# Patient Record
Sex: FEMALE | Race: WHITE | Employment: FULL TIME | URBAN - METROPOLITAN AREA
[De-identification: names, ages, dates, MRNs, and addresses within clinical notes are randomized per-mention and may not be internally consistent; named-entity substitution may affect disease eponyms.]

---
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
## 2023-01-13 ENCOUNTER — MYC MEDICAL ADVICE (OUTPATIENT)
Dept: FAMILY MEDICINE | Facility: OTHER | Age: 18
End: 2023-01-13

## 2023-01-13 DIAGNOSIS — K13.0 LIP LESION: Primary | ICD-10-CM

## 2023-01-16 NOTE — TELEPHONE ENCOUNTER
"Routine preventative exam on 11/7/22. Lip lesion was noted and was treated with liquid nitrogen. Provider indicated: \"- If doesn't improve or returns, would possibly need referral to ENT for removal \"    PCP-please see pt message that lesion is not improving. Would you like to place a referral or do you have further recommendations?    Belem Lynch, EMMAN, RN, PHN  Registered Nurse-Clinic Triage  [facility]  1/16/2023 at 8:12 AM    "

## 2023-04-18 NOTE — PROGRESS NOTES
Chief Complaint   Patient presents with     Ent Problem     Small lesion noted on bottom of lip - patient tried to pop it with bloody returns and it got larger for short time - noted for about a year- denies pain at site      History of Present Illness   Lita Alvares is a 17 year old female who presents today for evaluation.  I am seeing this patient in consultation for lip lesion at the request of the provider Mandi Uribe PA-C. The patient presents with a lesion on the right lower mucosal lip. The patient has noticed for a few years. It hasn't been changing in size. It is not painful. No citrus or spicy intolerance.  Initially she popped the lesion and it bled a bit before stopping.  It then got a bit bigger but now is gotten smaller again.  They had tried to treat it with liquid nitrogen but this did not resolve the lesion.  The patient is a non-smoker, and no history of chewing tobacco. No lumps or swollen glands in the neck.     Past Medical History  Patient Active Problem List   Diagnosis     No active medical problems     Current Medications   No current outpatient medications on file.    Allergies  No Known Allergies    Social History   Social History     Socioeconomic History     Marital status: Single   Tobacco Use     Smoking status: Never     Smokeless tobacco: Never   Vaping Use     Vaping status: Never Used   Substance and Sexual Activity     Alcohol use: Never     Drug use: Never     Sexual activity: Yes     Partners: Male       Family History  Family History   Problem Relation Age of Onset     Cancer Maternal Grandmother         colon     Cerebrovascular Disease Maternal Grandmother      Cancer Maternal Grandfather         colon     Heart Disease Maternal Grandfather 50     Cancer Paternal Grandfather         Hodgkins/Prostate     Asthma Paternal Grandfather      Asthma Paternal Uncle        Review of Systems  As per HPI and PMHx, otherwise 10+ comprehensive system review is  negative.    Physical Exam  /73 (BP Location: Right arm, Patient Position: Chair, Cuff Size: Adult Regular)   Pulse 72   Temp 98.4  F (36.9  C) (Tympanic)   Wt 68 kg (150 lb)   BMI 29.03 kg/m    GENERAL: Patient is a pleasant, cooperative 17 year old female in no acute distress.  HEAD: Normocephalic, atraumatic.  Hair and scalp are normal.  EYES: Pupils are equal, round, reactive to light and accommodation.  Extraocular movements are intact.  The sclera nonicteric without injection.  The extraocular structures are normal.  EARS: Normal shape and symmetry.  No tenderness when palpating the mastoid or tragal areas bilaterally.   NOSE: Nares are patent.  Nasal mucosa is pink and moist.  Negative anterior rhinoscopy.  ORAL CAVITY: Examination of the right lower lip reveals a 3 to 4 mm exophytic purpleish hue lesion consistent with a venous lake.  Dentition is in good repair.  Mucous membranes are moist. No additional oral cavity or oropharyngeal masses, lesions, ulcerations, leukoplakia.  NEUROLOGIC: Cranial nerves II through XII are grossly intact.  Voice is strong.  Patient is House-Brackmann I/VI bilaterally.  CARDIOVASCULAR: Extremities are warm and well-perfused.  No significant peripheral edema.  RESPIRATORY: Patient has nonlabored breathing without cough, wheeze, stridor.  PSYCHIATRIC: Patient is alert and oriented.  Mood and affect appear normal.  SKIN: Warm and dry.  No scalp, face, or neck lesions noted.    Assessment and Plan     ICD-10-CM    1. 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  The patient presents today with a lesion on the right lower lip that has a clinical appearance of a venous lake or a vascular lesion.  I think surgical excision might be too aggressive and might leave unsightly scarring.  I will send a message to dermatology to see if they do any lesion ablation, pulsed dye  laser, or vascular laser treatments for these lesions.    Ed Masterson MD  Department of Otolaryngology-Head and Neck Surgery  [facility]

## 2023-04-19 ENCOUNTER — OFFICE VISIT (OUTPATIENT)
Dept: OTOLARYNGOLOGY | Facility: CLINIC | Age: 18
End: 2023-04-19
Attending: PHYSICIAN ASSISTANT
Payer: COMMERCIAL

## 2023-04-19 VITALS
HEART RATE: 72 BPM | BODY MASS INDEX: 29.03 KG/M2 | TEMPERATURE: 98.4 F | DIASTOLIC BLOOD PRESSURE: 73 MMHG | WEIGHT: 150 LBS | SYSTOLIC BLOOD PRESSURE: 125 MMHG

## 2023-04-19 DIAGNOSIS — K13.0 LIP LESION: Primary | ICD-10-CM

## 2023-04-19 DIAGNOSIS — R23.8 VENOUS LAKE OF LIP: ICD-10-CM

## 2023-04-19 PROCEDURE — 99243 OFF/OP CNSLTJ NEW/EST LOW 30: CPT | Performed by: OTOLARYNGOLOGY

## 2023-04-19 ASSESSMENT — PAIN SCALES - GENERAL: PAINLEVEL: NO PAIN (0)

## 2023-04-19 NOTE — LETTER
4/19/2023         RE: Lita Alvares  [address] [facility] [address]        Dear Colleague,    Thank you for referring your patient, Lita Alvares, to the [facility]. Please see a copy of my visit note below.    Chief Complaint   Patient presents with     Ent Problem     Small lesion noted on bottom of lip - patient tried to pop it with bloody returns and it got larger for short time - noted for about a year- denies pain at site      History of Present Illness   Lita Alvares is a 17 year old female who presents today for evaluation.  I am seeing this patient in consultation for lip lesion at the request of the provider Mandi Uribe PA-C. The patient presents with a lesion on the right lower mucosal lip. The patient has noticed for a few years. It hasn't been changing in size. It is not painful. No citrus or spicy intolerance.  Initially she popped the lesion and it bled a bit before stopping.  It then got a bit bigger but now is gotten smaller again.  They had tried to treat it with liquid nitrogen but this did not resolve the lesion.  The patient is a non-smoker, and no history of chewing tobacco. No lumps or swollen glands in the neck.     Past Medical History  Patient Active Problem List   Diagnosis     No active medical problems     Current Medications   No current outpatient medications on file.    Allergies  No Known Allergies    Social History   Social History     Socioeconomic History     Marital status: Single   Tobacco Use     Smoking status: Never     Smokeless tobacco: Never   Vaping Use     Vaping status: Never Used   Substance and Sexual Activity     Alcohol use: Never     Drug use: Never     Sexual activity: Yes     Partners: Male       Family History  Family History   Problem Relation Age of Onset     Cancer Maternal Grandmother         colon     Cerebrovascular Disease Maternal Grandmother      Cancer Maternal Grandfather          colon     Heart Disease Maternal Grandfather 50     Cancer Paternal Grandfather         Hodgkins/Prostate     Asthma Paternal Grandfather      Asthma Paternal Uncle        Review of Systems  As per HPI and PMHx, otherwise 10+ comprehensive system review is negative.    Physical Exam  /73 (BP Location: Right arm, Patient Position: Chair, Cuff Size: Adult Regular)   Pulse 72   Temp 98.4  F (36.9  C) (Tympanic)   Wt 68 kg (150 lb)   BMI 29.03 kg/m    GENERAL: Patient is a pleasant, cooperative 17 year old female in no acute distress.  HEAD: Normocephalic, atraumatic.  Hair and scalp are normal.  EYES: Pupils are equal, round, reactive to light and accommodation.  Extraocular movements are intact.  The sclera nonicteric without injection.  The extraocular structures are normal.  EARS: Normal shape and symmetry.  No tenderness when palpating the mastoid or tragal areas bilaterally.   NOSE: Nares are patent.  Nasal mucosa is pink and moist.  Negative anterior rhinoscopy.  ORAL CAVITY: Examination of the right lower lip reveals a 3 to 4 mm exophytic purpleish hue lesion consistent with a venous lake.  Dentition is in good repair.  Mucous membranes are moist. No additional oral cavity or oropharyngeal masses, lesions, ulcerations, leukoplakia.  NEUROLOGIC: Cranial nerves II through XII are grossly intact.  Voice is strong.  Patient is House-Brackmann I/VI bilaterally.  CARDIOVASCULAR: Extremities are warm and well-perfused.  No significant peripheral edema.  RESPIRATORY: Patient has nonlabored breathing without cough, wheeze, stridor.  PSYCHIATRIC: Patient is alert and oriented.  Mood and affect appear normal.  SKIN: Warm and dry.  No scalp, face, or neck lesions noted.    Assessment and Plan     ICD-10-CM    1. 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  The patient presents today with a lesion on the  right lower lip that has a clinical appearance of a venous lake or a vascular lesion.  I think surgical excision might be too aggressive and might leave unsightly scarring.  I will send a message to dermatology to see if they do any lesion ablation, pulsed dye laser, or vascular laser treatments for these lesions.    Ed Masterson MD  Department of Otolaryngology-Head and Neck Surgery  [facility]         Again, thank you for allowing me to participate in the care of your patient.        Sincerely,        Ed Masterson MD

## 2023-04-19 NOTE — NURSING NOTE
"Initial /73 (BP Location: Right arm, Patient Position: Chair, Cuff Size: Adult Regular)   Pulse 72   Temp 98.4  F (36.9  C) (Tympanic)   Wt 68 kg (150 lb)   BMI 29.03 kg/m   Estimated body mass index is 29.03 kg/m  as calculated from the following:    Height as of 11/7/22: 1.531 m (5' 0.28\").    Weight as of this encounter: 68 kg (150 lb). .      "

## 2023-04-20 ENCOUNTER — TELEPHONE (OUTPATIENT)
Dept: DERMATOLOGY | Facility: CLINIC | Age: 18
End: 2023-04-20
Payer: COMMERCIAL

## 2023-04-20 NOTE — TELEPHONE ENCOUNTER
----- Message from Jens Cristina sent at 4/20/2023 10:03 AM CDT -----  Regarding: FW: Venous lake/vascular lip lesion  Date and Time: 04/20/23 10:04 AM    Call Reason: appointment    Outcome: Left message on cell phone for Lita Alvares to call and schedule an appointment:    Clinic: Dermatology    With: Joni Lawrence MD    Appointment should be scheduled within:  2 weeks    Visit Mode: in person visit     Reason for Appointment: Procedure    Provider Approved Double Book:Yes: Held appt on 5/3/23 @ 830    Additional Information: Steph Hernández of lip Cautery per   ref Aleja            ----- Message -----  From: Brandee Yanez RN  Sent: 4/20/2023   9:45 AM CDT  [facility]  Subject: FW: Venous lake/vascular lip lesion              Patient can be double booked in the next two weeks at [place]-   ----- Message -----  From: Joni Lawrence MD  Sent: 4/20/2023   7:37 AM CDT  To: Ed Masterson MD; Katie Menon PA-C; #  Subject: RE: Venous lake/vascular lip lesion              Hi Ed    We could just try cautery that area of the lip heals very well.  Laser is another option but insurance will not cover so I would try cautery first    I will have our team schedule her    Thanks      ----- Message -----  From: Ed Masterson MD  Sent: 4/19/2023   2:10 PM CDT  To: Katie Menon PA-C; #  Subject: Venous lake/vascular lip lesion                  I have a young lady with a small venous lake of her lip.  I do not want to excise it and leave a scar.  I was not sure if you guys did in the ablation, laser, other treatments that would avoid having a scar on her lip in this area?  There are pictures in the chart.    IJL

## 2023-04-21 NOTE — CONFIDENTIAL NOTE
Called patient and changed to Monday procedure spot.  Thank you,    Brisa ZULUAGARN BSN  [facility]- [phone]

## 2023-04-22 ENCOUNTER — HEALTH MAINTENANCE LETTER (OUTPATIENT)
Age: 18
End: 2023-04-22

## 2023-04-24 ENCOUNTER — OFFICE VISIT (OUTPATIENT)
Dept: DERMATOLOGY | Facility: CLINIC | Age: 18
End: 2023-04-24
Payer: COMMERCIAL

## 2023-04-24 DIAGNOSIS — R23.8 VENOUS LAKE: ICD-10-CM

## 2023-04-24 DIAGNOSIS — D23.9 DERMAL NEVUS: ICD-10-CM

## 2023-04-24 DIAGNOSIS — L81.4 LENTIGO: Primary | ICD-10-CM

## 2023-04-24 PROCEDURE — 17110 DESTRUCTION B9 LES UP TO 14: CPT | Performed by: DERMATOLOGY

## 2023-04-24 PROCEDURE — 99243 OFF/OP CNSLTJ NEW/EST LOW 30: CPT | Mod: 25 | Performed by: DERMATOLOGY

## 2023-04-24 NOTE — PROGRESS NOTES
Lita Alvares , a 17 year old year old female patient, I was asked to see by Dr. Masterson for spot on lip.  Patient states this has been present for a while.  Patient reports the following symptoms:  Red and bleeding .  Patient reports the following previous treatments none.  Patient reports the following modifying factors none.  Associated symptoms: none.  Patient has no other skin complaints today.  Remainder of the HPI, Meds, PMH, Allergies, FH, and SH was reviewed in chart.    History reviewed. No pertinent past medical history.    Past Surgical History:   Procedure Laterality Date     NO HISTORY OF SURGERY          Family History   Problem Relation Age of Onset     Cancer Maternal Grandmother         colon     Cerebrovascular Disease Maternal Grandmother      Cancer Maternal Grandfather         colon     Heart Disease Maternal Grandfather 50     Cancer Paternal Grandfather         Hodgkins/Prostate     Asthma Paternal Grandfather      Asthma Paternal Uncle        Social History     Socioeconomic History     Marital status: Single     Spouse name: Not on file     Number of children: Not on file     Years of education: Not on file     Highest education level: Not on file   Occupational History     Not on file   Tobacco Use     Smoking status: Never     Smokeless tobacco: Never   Vaping Use     Vaping status: Never Used   Substance and Sexual Activity     Alcohol use: Never     Drug use: Never     Sexual activity: Yes     Partners: Male   Other Topics Concern     Not on file   Social History Narrative     Not on file     Social Determinants of Health     Financial Resource Strain: Not on file   Food Insecurity: Not on file   Transportation Needs: Not on file   Physical Activity: Not on file   Stress: Not on file   Social Connections: Not on file   Intimate Partner Violence: Not on file   Housing Stability: Not on file       No outpatient encounter medications on file as of 4/24/2023.     No  facility-administered encounter medications on file as of 4/24/2023.             Review Of Systems  Skin: As above  Eyes: negative  Ears/Nose/Throat: negative  Respiratory: No shortness of breath, dyspnea on exertion, cough, or hemoptysis  Cardiovascular: negative  Gastrointestinal: negative  Genitourinary: negative  Musculoskeletal: negative  Neurologic: negative  Psychiatric: negative  Hematologic/Lymphatic/Immunologic: negative  Endocrine: negative      O:   NAD, WDWN, Alert & Oriented, Mood & Affect wnl, Vitals stable   Here today alone   General appearance alva ii   Vitals stable   Alert, oriented and in no acute distress   Brown macule son face  Flesh colored papules on face   r lower mucosal lip red papule       Eyes: Conjunctivae/lids:Normal     ENT: Lips, buccal mucosa, tongue: normal    MSK:Normal    Cardiovascular: peripheral edema none    Pulm: Breathing Normal    Neuro/Psych: Orientation:Normal; Mood/Affect:Normal      A/P:  1. lentigo, dermal nevus  2. Venous lake  Laser and cautery discussed with patient   Cautery After consent, anesthesia with LE and prep, cautery performed.  No complications and routine wound care.    It was a pleasure speaking to Lita Alvares today.  Previous clinic  notes and pertinent laboratory tests were reviewed prior to Lita Alvares's visit.  Nature of benign skin lesions dicussed with patient.  Return to clinic prn

## 2023-04-24 NOTE — LETTER
4/24/2023         RE: Lita Alvares  [address] [facility] [address]        Dear Colleague,    Thank you for referring your patient, Lita Alvares, to the [facility]. Please see a copy of my visit note below.    Lita Alvares , a 17 year old year old female patient, I was asked to see by Dr. Masterson for spot on lip.  Patient states this has been present for a while.  Patient reports the following symptoms:  Red and bleeding .  Patient reports the following previous treatments none.  Patient reports the following modifying factors none.  Associated symptoms: none.  Patient has no other skin complaints today.  Remainder of the HPI, Meds, PMH, Allergies, FH, and SH was reviewed in chart.    History reviewed. No pertinent past medical history.    Past Surgical History:   Procedure Laterality Date     NO HISTORY OF SURGERY          Family History   Problem Relation Age of Onset     Cancer Maternal Grandmother         colon     Cerebrovascular Disease Maternal Grandmother      Cancer Maternal Grandfather         colon     Heart Disease Maternal Grandfather 50     Cancer Paternal Grandfather         Hodgkins/Prostate     Asthma Paternal Grandfather      Asthma Paternal Uncle        Social History     Socioeconomic History     Marital status: Single     Spouse name: Not on file     Number of children: Not on file     Years of education: Not on file     Highest education level: Not on file   Occupational History     Not on file   Tobacco Use     Smoking status: Never     Smokeless tobacco: Never   Vaping Use     Vaping status: Never Used   Substance and Sexual Activity     Alcohol use: Never     Drug use: Never     Sexual activity: Yes     Partners: Male   Other Topics Concern     Not on file   Social History Narrative     Not on file     Social Determinants of Health     Financial Resource Strain: Not on file   Food Insecurity: Not on file   Transportation Needs: Not on  file   Physical Activity: Not on file   Stress: Not on file   Social Connections: Not on file   Intimate Partner Violence: Not on file   Housing Stability: Not on file       No outpatient encounter medications on file as of 4/24/2023.     No facility-administered encounter medications on file as of 4/24/2023.             Review Of Systems  Skin: As above  Eyes: negative  Ears/Nose/Throat: negative  Respiratory: No shortness of breath, dyspnea on exertion, cough, or hemoptysis  Cardiovascular: negative  Gastrointestinal: negative  Genitourinary: negative  Musculoskeletal: negative  Neurologic: negative  Psychiatric: negative  Hematologic/Lymphatic/Immunologic: negative  Endocrine: negative      O:   NAD, WDWN, Alert & Oriented, Mood & Affect wnl, Vitals stable   Here today alone   General appearance alva ii   Vitals stable   Alert, oriented and in no acute distress   Brown macule son face  Flesh colored papules on face   r lower mucosal lip red papule       Eyes: Conjunctivae/lids:Normal     ENT: Lips, buccal mucosa, tongue: normal    MSK:Normal    Cardiovascular: peripheral edema none    Pulm: Breathing Normal    Neuro/Psych: Orientation:Normal; Mood/Affect:Normal      A/P:  1. lentigo, dermal nevus  2. Venous lake  Laser and cautery discussed with patient   Cautery After consent, anesthesia with LE and prep, cautery performed.  No complications and routine wound care.    It was a pleasure speaking to Lita Alvares today.  Previous clinic  notes and pertinent laboratory tests were reviewed prior to Lita Alvares's visit.  Nature of benign skin lesions dicussed with patient.  Return to clinic prn         Again, thank you for allowing me to participate in the care of your patient.        Sincerely,        Joni Lawrence MD

## 2024-01-31 ENCOUNTER — TELEPHONE (OUTPATIENT)
Dept: FAMILY MEDICINE | Facility: OTHER | Age: 19
End: 2024-01-31
Payer: COMMERCIAL

## 2024-01-31 NOTE — TELEPHONE ENCOUNTER
Per provider, patient's appointment needs to be rescheduled due to schedule change.  Please reschedule patient in the next available  in clinic  slot.    Cancelled appointment with Mandi Uribe PA-C on 2/22/2024 at 9:40am.     Patient was called and message left informing patient/parent of the need to be rescheduled.  Appointment was cancelled and encounter created for message trail.    Naehast was also sent to inform patient of this. Kaur Lambert

## 2024-02-10 ENCOUNTER — HEALTH MAINTENANCE LETTER (OUTPATIENT)
Age: 19
End: 2024-02-10

## 2024-02-29 ENCOUNTER — TELEPHONE (OUTPATIENT)
Dept: FAMILY MEDICINE | Facility: OTHER | Age: 19
End: 2024-02-29
Payer: COMMERCIAL

## 2024-02-29 NOTE — TELEPHONE ENCOUNTER
Per provider, patient's appointment needs to be rescheduled due to change in provider's schedule.  Please reschedule patient in the next available  in clinic  slot.    Cancelled appointment with Mandi Uribe PA-C on 4/8/2024 at 5:10pm.     Patient was called and message left informing patient/parent of the need to be rescheduled.  Appointment was cancelled and encounter created for message trail.    Siteminist was also sent to inform patient of this. Kaur Lambert

## 2024-04-22 SDOH — HEALTH STABILITY: PHYSICAL HEALTH: ON AVERAGE, HOW MANY MINUTES DO YOU ENGAGE IN EXERCISE AT THIS LEVEL?: 60 MIN

## 2024-04-22 SDOH — HEALTH STABILITY: PHYSICAL HEALTH: ON AVERAGE, HOW MANY DAYS PER WEEK DO YOU ENGAGE IN MODERATE TO STRENUOUS EXERCISE (LIKE A BRISK WALK)?: 4 DAYS

## 2024-04-22 ASSESSMENT — SOCIAL DETERMINANTS OF HEALTH (SDOH): HOW OFTEN DO YOU GET TOGETHER WITH FRIENDS OR RELATIVES?: MORE THAN THREE TIMES A WEEK

## 2024-04-23 ENCOUNTER — OFFICE VISIT (OUTPATIENT)
Dept: FAMILY MEDICINE | Facility: OTHER | Age: 19
End: 2024-04-23
Payer: COMMERCIAL

## 2024-04-23 VITALS
DIASTOLIC BLOOD PRESSURE: 70 MMHG | RESPIRATION RATE: 16 BRPM | TEMPERATURE: 98.1 F | BODY MASS INDEX: 28.83 KG/M2 | HEART RATE: 99 BPM | SYSTOLIC BLOOD PRESSURE: 120 MMHG | OXYGEN SATURATION: 98 % | WEIGHT: 149 LBS

## 2024-04-23 DIAGNOSIS — Z00.00 ROUTINE GENERAL MEDICAL EXAMINATION AT A HEALTH CARE FACILITY: Primary | ICD-10-CM

## 2024-04-23 DIAGNOSIS — Z13.1 SCREENING FOR DIABETES MELLITUS: ICD-10-CM

## 2024-04-23 DIAGNOSIS — Z13.220 SCREENING FOR LIPID DISORDERS: ICD-10-CM

## 2024-04-23 LAB
CHOLEST SERPL-MCNC: 148 MG/DL
FASTING STATUS PATIENT QL REPORTED: NO
FASTING STATUS PATIENT QL REPORTED: NO
GLUCOSE SERPL-MCNC: 84 MG/DL (ref 70–99)
HDLC SERPL-MCNC: 61 MG/DL
LDLC SERPL CALC-MCNC: 75 MG/DL
NONHDLC SERPL-MCNC: 87 MG/DL
TRIGL SERPL-MCNC: 58 MG/DL

## 2024-04-23 PROCEDURE — 36415 COLL VENOUS BLD VENIPUNCTURE: CPT | Performed by: PHYSICIAN ASSISTANT

## 2024-04-23 PROCEDURE — 82947 ASSAY GLUCOSE BLOOD QUANT: CPT | Performed by: PHYSICIAN ASSISTANT

## 2024-04-23 PROCEDURE — 99395 PREV VISIT EST AGE 18-39: CPT | Performed by: PHYSICIAN ASSISTANT

## 2024-04-23 PROCEDURE — 80061 LIPID PANEL: CPT | Performed by: PHYSICIAN ASSISTANT

## 2024-04-23 ASSESSMENT — PAIN SCALES - GENERAL: PAINLEVEL: NO PAIN (0)

## 2024-04-23 NOTE — PATIENT INSTRUCTIONS
Preventive Care Advice   This is general advice given by our system to help you stay healthy. However, your care team may have specific advice just for you. Please talk to your care team about your preventive care needs.  Nutrition  Eat 5 or more servings of fruits and vegetables each day.  Try wheat bread, brown rice and whole grain pasta (instead of white bread, rice, and pasta).  Get enough calcium and vitamin D. Check the label on foods and aim for 100% of the RDA (recommended daily allowance).  Lifestyle  Exercise at least 150 minutes each week   (30 minutes a day, 5 days a week).  Do muscle strengthening activities 2 days a week. These help control your weight and prevent disease.  No smoking.  Wear sunscreen to prevent skin cancer.  Have a dental exam and cleaning every 6 months.  Yearly exams  See your health care team every year to talk about:  Any changes in your health.  Any medicines your care team has prescribed.  Preventive care, family planning, and ways to prevent chronic diseases.  Shots (vaccines)   HPV shots (up to age 26), if you've never had them before.  Hepatitis B shots (up to age 59), if you've never had them before.  COVID-19 shot: Get this shot when it's due.  Flu shot: Get a flu shot every year.  Tetanus shot: Get a tetanus shot every 10 years.  Pneumococcal, hepatitis A, and RSV shots: Ask your care team if you need these based on your risk.  Shingles shot (for age 50 and up).  General health tests  Diabetes screening:  Starting at age 35, Get screened for diabetes at least every 3 years.  If you are younger than age 35, ask your care team if you should be screened for diabetes.  Cholesterol test: At age 39, start having a cholesterol test every 5 years, or more often if advised.  Bone density scan (DEXA): At age 50, ask your care team if you should have this scan for osteoporosis (brittle bones).  Hepatitis C: Get tested at least once in your life.  STIs (sexually transmitted  infections)  Before age 24: Ask your care team if you should be screened for STIs.  After age 24: Get screened for STIs if you're at risk. You are at risk for STIs (including HIV) if:  You are sexually active with more than one person.  You don't use condoms every time.  You or a partner was diagnosed with a sexually transmitted infection.  If you are at risk for HIV, ask about PrEP medicine to prevent HIV.  Get tested for HIV at least once in your life, whether you are at risk for HIV or not.  Cancer screening tests  Cervical cancer screening: If you have a cervix, begin getting regular cervical cancer screening tests at age 21. Most people who have regular screenings with normal results can stop after age 65. Talk about this with your provider.  Breast cancer scan (mammogram): If you've ever had breasts, begin having regular mammograms starting at age 40. This is a scan to check for breast cancer.  Colon cancer screening: It is important to start screening for colon cancer at age 45.  Have a colonoscopy test every 10 years (or more often if you're at risk) Or, ask your provider about stool tests like a FIT test every year or Cologuard test every 3 years.  To learn more about your testing options, visit: https://www.[employer]/514715.pdf.  For help making a decision, visit: https://bit.ly/rw43106.  Prostate cancer screening test: If you have a prostate and are age 55 to 69, ask your provider if you would benefit from a yearly prostate cancer screening test.  Lung cancer screening: If you are a current or former smoker age 50 to 80, ask your care team if ongoing lung cancer screenings are right for you.  For informational purposes only. Not to replace the advice of your health care provider. Copyright   2023 [employer]. All rights reserved. Clinically reviewed by the [facility]. [employer] 125021 - REV 01/24.    Learning About Stress  What is stress?     Stress is your  body's response to a hard situation. Your body can have a physical, emotional, or mental response. Stress is a fact of life for most people, and it affects everyone differently. What causes stress for you may not be stressful for someone else.  A lot of things can cause stress. You may feel stress when you go on a job interview, take a test, or run a race. This kind of short-term stress is normal and even useful. It can help you if you need to work hard or react quickly. For example, stress can help you finish an important job on time.  Long-term stress is caused by ongoing stressful situations or events. Examples of long-term stress include long-term health problems, ongoing problems at work, or conflicts in your family. Long-term stress can harm your health.  How does stress affect your health?  When you are stressed, your body responds as though you are in danger. It makes hormones that speed up your heart, make you breathe faster, and give you a burst of energy. This is called the fight-or-flight stress response. If the stress is over quickly, your body goes back to normal and no harm is done.  But if stress happens too often or lasts too long, it can have bad effects. Long-term stress can make you more likely to get sick, and it can make symptoms of some diseases worse. If you tense up when you are stressed, you may develop neck, shoulder, or low back pain. Stress is linked to high blood pressure and heart disease.  Stress also harms your emotional health. It can make you huggins, tense, or depressed. Your relationships may suffer, and you may not do well at work or school.  What can you do to manage stress?  You can try these things to help manage stress:   Do something active. Exercise or activity can help reduce stress. Walking is a great way to get started. Even everyday activities such as housecleaning or yard work can help.  Try yoga or ja chi. These techniques combine exercise and meditation. You may need  some training at first to learn them.  Do something you enjoy. For example, listen to music or go to a movie. Practice your hobby or do volunteer work.  Meditate. This can help you relax, because you are not worrying about what happened before or what may happen in the future.  Do guided imagery. Imagine yourself in any setting that helps you feel calm. You can use online videos, books, or a teacher to guide you.  Do breathing exercises. For example:  From a standing position, bend forward from the waist with your knees slightly bent. Let your arms dangle close to the floor.  Breathe in slowly and deeply as you return to a standing position. Roll up slowly and lift your head last.  Hold your breath for just a few seconds in the standing position.  Breathe out slowly and bend forward from the waist.  Let your feelings out. Talk, laugh, cry, and express anger when you need to. Talking with supportive friends or family, a counselor, or a jackie leader about your feelings is a healthy way to relieve stress. Avoid discussing your feelings with people who make you feel worse.  Write. It may help to write about things that are bothering you. This helps you find out how much stress you feel and what is causing it. When you know this, you can find better ways to cope.  What can you do to prevent stress?  You might try some of these things to help prevent stress:  Manage your time. This helps you find time to do the things you want and need to do.  Get enough sleep. Your body recovers from the stresses of the day while you are sleeping.  Get support. Your family, friends, and community can make a difference in how you experience stress.  Limit your news feed. Avoid or limit time on social media or news that may make you feel stressed.  Do something active. Exercise or activity can help reduce stress. Walking is a great way to get started.  Where can you learn more?  Go to https://www.healthwise.net/patiented  Enter N032 in the  "search box to learn more about \"Learning About Stress.\"  Current as of: October 24, 2023               Content Version: 14.0    5536-4206 [employer].   Care instructions adapted under license by your healthcare professional. If you have questions about a medical condition or this instruction, always ask your healthcare professional. [employer] disclaims any warranty or liability for your use of this information.      "

## 2024-04-23 NOTE — PROGRESS NOTES
Preventive Care Visit  [facility]  Mandi LShwetha Uribe PA-C, Family Medicine  Apr 23, 2024    Assessment & Plan     ICD-10-CM    1. Routine general medical examination at a health care facility  Z00.00       2. Screening for diabetes mellitus  Z13.1 Glucose      3. Screening for lipid disorders  Z13.220 Lipid panel reflex to direct LDL Fasting          Patient has been advised of split billing requirements and indicates understanding: Yes      Counseling  Appropriate preventive services were discussed with this patient, including applicable screening as appropriate for fall prevention, nutrition, physical activity, Tobacco-use cessation, weight loss and cognition.  Checklist reviewing preventive services available has been given to the patient.  Reviewed patient's diet, addressing concerns and/or questions.   She is at risk for psychosocial distress and has been provided with information to reduce risk.     - Patient reports doing quite well  - Stopped OCP awhile ago, feels better off it   - Has fiancee, no concerns for stds   - Will gets screening labs, await results     Review of the result(s) of each unique test - None     Diagnosis or treatment significantly limited by social determinants of health - none     20 minutes spent on the date of the encounter doing chart review, history and exam, documentation and further activities as noted above    The patient indicates understanding of these issues and agrees with the plan.    Follow up: 1 year or PRN     I, Taran Uribe PA-C,  was present with the PA student who participated in the service and in the documentation of the note.  I have verified the history and personally performed the physical exam and medical decision making.  I agree with the assessment and plan of care as documented in the note.     VICKIE MccainS   [facility]     Taran Uribe PA-C  [facility]          Megha London is a 18 year old, presenting for the following:  Physical         Health Care Directive  Patient does not have a Health Care Directive or Living Will: Discussed advance care planning with patient; however, patient declined at this time.    HPI    - Patient reports doing well  - Stopped OCP and feels better off it   - Bought a house with her fitammye   - Planning a wedding         4/22/2024   General Health   How would you rate your overall physical health? Good   Feel stress (tense, anxious, or unable to sleep) Only a little   (!) STRESS CONCERN      4/22/2024   Nutrition   Three or more servings of calcium each day? (!) I DON'T KNOW   Diet: Regular (no restrictions)   How many servings of fruit and vegetables per day? (!) 2-3   How many sweetened beverages each day? 0-1         4/22/2024   Exercise   Days per week of moderate/strenous exercise 4 days   Average minutes spent exercising at this level 60 min         4/22/2024   Social Factors   Frequency of gathering with friends or relatives More than three times a week   Worry food won't last until get money to buy more No   Food not last or not have enough money for food? No   Do you have housing?  Yes   Are you worried about losing your housing? No   Lack of transportation? No   Unable to get utilities (heat,electricity)? No         4/22/2024   Dental   Dentist two times every year? Yes         4/22/2024   TB Screening   Were you born outside of the [place]? No         Today's PHQ-2 Score:       4/22/2024     7:27 PM   PHQ-2 ( 1999 [employer])   Q1: Little interest or pleasure in doing things 0   Q2: Feeling down, depressed or hopeless 0   PHQ-2 Score 0   Q1: Little interest or pleasure in doing things Not at all   Q2: Feeling down, depressed or hopeless Not at all   PHQ-2 Score 0           4/22/2024   Substance Use   Alcohol more than 3/day or more than 7/wk Not Applicable   Do you use any other substances recreationally? No     Social History  "    Tobacco Use    Smoking status: Never    Smokeless tobacco: Never   Vaping Use    Vaping status: Never Used   Substance Use Topics    Alcohol use: Never    Drug use: Never           4/22/2024   One time HIV Screening   Previous HIV test? Yes         4/22/2024   STI Screening   New sexual partner(s) since last STI/HIV test? No     History of abnormal Pap smear: NO - under age 21, PAP not appropriate for age             4/22/2024   Contraception/Family Planning   Questions about contraception or family planning No        Reviewed and updated as needed this visit by Provider   Tobacco  Allergies  Meds  Problems  Med Hx  Surg Hx  Fam Hx            History reviewed. No pertinent past medical history.  Past Surgical History:   Procedure Laterality Date    NO HISTORY OF SURGERY       Lab work is in process  Labs reviewed in EPIC      Review of Systems  Constitutional, neuro, ENT, endocrine, pulmonary, cardiac, gastrointestinal, genitourinary, musculoskeletal, integument and psychiatric systems are negative, except as otherwise noted.     Objective    Exam  /70   Pulse 99   Temp 98.1  F (36.7  C) (Temporal)   Resp 16   Wt 67.6 kg (149 lb)   SpO2 98%   BMI 28.83 kg/m     Estimated body mass index is 29.03 kg/m  as calculated from the following:    Height as of 11/7/22: 1.531 m (5' 0.28\").    Weight as of 4/19/23: 68 kg (150 lb).    Physical Exam  Constitutional:       General: She is not in acute distress.     Appearance: She is well-developed.   HENT:      Right Ear: External ear normal. No middle ear effusion. There is no impacted cerumen. Tympanic membrane is not injected, perforated, erythematous or bulging.      Left Ear: External ear normal.  No middle ear effusion. There is no impacted cerumen. Tympanic membrane is not injected, perforated, erythematous or bulging.      Nose: Nose normal. No mucosal edema or rhinorrhea.      Mouth/Throat:      Dentition: Normal dentition.      Tongue: No lesions. " Tongue does not deviate from midline.      Palate: No lesions.      Pharynx: No pharyngeal swelling, oropharyngeal exudate or posterior oropharyngeal erythema.      Tonsils: No tonsillar exudate or tonsillar abscesses.   Eyes:      General: Lids are normal.         Right eye: No discharge.         Left eye: No discharge.      Conjunctiva/sclera: Conjunctivae normal.      Right eye: Right conjunctiva is not injected.      Left eye: Left conjunctiva is not injected.      Pupils: Pupils are equal, round, and reactive to light.   Neck:      Thyroid: No thyroid mass or thyromegaly.      Vascular: No JVD.      Trachea: Trachea normal. No tracheal deviation.   Cardiovascular:      Rate and Rhythm: Normal rate and regular rhythm.      Heart sounds: Normal heart sounds. No murmur heard.     No friction rub. No gallop.   Pulmonary:      Effort: Pulmonary effort is normal. No respiratory distress.      Breath sounds: Normal breath sounds. No stridor or decreased air movement. No wheezing, rhonchi or rales.   Abdominal:      General: Bowel sounds are normal. There is no distension.      Palpations: Abdomen is soft. There is no mass.      Tenderness: There is no abdominal tenderness. There is no guarding or rebound.      Hernia: No hernia is present.   Musculoskeletal:         General: Normal range of motion.      Cervical back: Normal range of motion and neck supple.   Lymphadenopathy:      Cervical: No cervical adenopathy.   Skin:     General: Skin is warm and dry.   Neurological:      Mental Status: She is alert and oriented to person, place, and time.   Psychiatric:         Behavior: Behavior normal.         Thought Content: Thought content normal.         Judgment: Judgment normal.       Diagnostics: see orders pending in Epic         Signed Electronically by: Mandi Uribe PA-C  \

## 2024-04-24 NOTE — RESULT ENCOUNTER NOTE
Moni London    Your results were normal.     The results are attached for your review.       Taran Uribe PA-C

## 2025-03-24 ENCOUNTER — PATIENT OUTREACH (OUTPATIENT)
Dept: CARE COORDINATION | Facility: CLINIC | Age: 20
End: 2025-03-24
Payer: COMMERCIAL

## 2025-04-07 ENCOUNTER — PATIENT OUTREACH (OUTPATIENT)
Dept: CARE COORDINATION | Facility: CLINIC | Age: 20
End: 2025-04-07
Payer: COMMERCIAL

## 2025-06-01 SDOH — HEALTH STABILITY: PHYSICAL HEALTH: ON AVERAGE, HOW MANY MINUTES DO YOU ENGAGE IN EXERCISE AT THIS LEVEL?: 20 MIN

## 2025-06-01 SDOH — HEALTH STABILITY: PHYSICAL HEALTH: ON AVERAGE, HOW MANY DAYS PER WEEK DO YOU ENGAGE IN MODERATE TO STRENUOUS EXERCISE (LIKE A BRISK WALK)?: 2 DAYS

## 2025-06-01 ASSESSMENT — SOCIAL DETERMINANTS OF HEALTH (SDOH): HOW OFTEN DO YOU GET TOGETHER WITH FRIENDS OR RELATIVES?: ONCE A WEEK

## 2025-06-02 ENCOUNTER — OFFICE VISIT (OUTPATIENT)
Dept: FAMILY MEDICINE | Facility: OTHER | Age: 20
End: 2025-06-02
Attending: PHYSICIAN ASSISTANT
Payer: COMMERCIAL

## 2025-06-02 VITALS
HEART RATE: 91 BPM | HEIGHT: 61 IN | BODY MASS INDEX: 29.45 KG/M2 | TEMPERATURE: 98.4 F | DIASTOLIC BLOOD PRESSURE: 66 MMHG | RESPIRATION RATE: 20 BRPM | WEIGHT: 156 LBS | OXYGEN SATURATION: 98 % | SYSTOLIC BLOOD PRESSURE: 118 MMHG

## 2025-06-02 DIAGNOSIS — Z23 NEED FOR VACCINATION: ICD-10-CM

## 2025-06-02 DIAGNOSIS — Z00.00 ROUTINE GENERAL MEDICAL EXAMINATION AT A HEALTH CARE FACILITY: Primary | ICD-10-CM

## 2025-06-02 DIAGNOSIS — J35.8 TONSILLAR CALCULUS: ICD-10-CM

## 2025-06-02 DIAGNOSIS — Z12.4 CERVICAL CANCER SCREENING: ICD-10-CM

## 2025-06-02 PROCEDURE — 90620 MENB-4C VACCINE IM: CPT | Performed by: PHYSICIAN ASSISTANT

## 2025-06-02 PROCEDURE — 3074F SYST BP LT 130 MM HG: CPT | Performed by: PHYSICIAN ASSISTANT

## 2025-06-02 PROCEDURE — 90715 TDAP VACCINE 7 YRS/> IM: CPT | Performed by: PHYSICIAN ASSISTANT

## 2025-06-02 PROCEDURE — 3078F DIAST BP <80 MM HG: CPT | Performed by: PHYSICIAN ASSISTANT

## 2025-06-02 PROCEDURE — G0145 SCR C/V CYTO,THINLAYER,RESCR: HCPCS | Performed by: PHYSICIAN ASSISTANT

## 2025-06-02 PROCEDURE — 99395 PREV VISIT EST AGE 18-39: CPT | Mod: 25 | Performed by: PHYSICIAN ASSISTANT

## 2025-06-02 PROCEDURE — 90471 IMMUNIZATION ADMIN: CPT | Performed by: PHYSICIAN ASSISTANT

## 2025-06-02 PROCEDURE — 1126F AMNT PAIN NOTED NONE PRSNT: CPT | Performed by: PHYSICIAN ASSISTANT

## 2025-06-02 PROCEDURE — G2211 COMPLEX E/M VISIT ADD ON: HCPCS | Performed by: PHYSICIAN ASSISTANT

## 2025-06-02 PROCEDURE — 90472 IMMUNIZATION ADMIN EACH ADD: CPT | Performed by: PHYSICIAN ASSISTANT

## 2025-06-02 PROCEDURE — 99213 OFFICE O/P EST LOW 20 MIN: CPT | Mod: 25 | Performed by: PHYSICIAN ASSISTANT

## 2025-06-02 ASSESSMENT — PAIN SCALES - GENERAL: PAINLEVEL_OUTOF10: NO PAIN (0)

## 2025-06-02 NOTE — PROGRESS NOTES
"Preventive Care Visit  [facility]  Mandi VENEGASShwetha Uribe PA-C, Family Medicine  Jun 2, 2025      Assessment & Plan     Routine general medical examination at a health care facility  - Routine exam preformed    Cervical cancer screening  - Screening and pelvic exam preformed today  - normal female external genitalia, normal urethral meatus, normal vaginal mucosa  - Pap Screen Only - Recommended Age 21 - 24 Years    Need for vaccination  - Stable, the following vaccines provided today  - Discusses vaccine side effects  - TDAP 10-64Y (ADACEL,BOOSTRIX)  - MENINGOCOCCAL B 10-25Y (BEXSERO )    Tonsillar calculus  - Recurrent stones  - Denies fever, infection  - Tonsils on examination appear normal, no erythema, signs of stones, or signs of infection/abscess  - Recommend discussing with ENT for treatment options   - Adult ENT  Referral; Future    Patient has been advised of split billing requirements and indicates understanding: Yes        BMI  Estimated body mass index is 29.84 kg/m  as calculated from the following:    Height as of this encounter: 1.54 m (5' 0.63\").    Weight as of this encounter: 70.8 kg (156 lb).   Weight management plan: Discussed healthy diet and exercise guidelines    Counseling  Appropriate preventive services were addressed with this patient via screening, questionnaire, or discussion as appropriate for fall prevention, nutrition, physical activity, Tobacco-use cessation, social engagement, weight loss and cognition.  Checklist reviewing preventive services available has been given to the patient.  Reviewed patient's diet, addressing concerns and/or questions.   She is at risk for lack of exercise and has been provided with information to increase physical activity for the benefit of her well-being.       The patient indicates understanding of these issues and agrees with the plan.    Follow up: 1 year or PRN     TRIP, Taran Uribe PA-C,  was " present with the PA student who participated in the service and in the documentation of the note.  I have verified the history and personally performed the physical exam and medical decision making.  I agree with the assessment and plan of care as documented in the note.     VICKIE StovallS   [facility]     Taran Uribe PA-C  [facility]       Subjective   Lita is a 19 year old, presenting for the following:  Physical        6/2/2025     4:31 PM   Additional Questions   Roomed by Nan   Accompanied by Self         6/2/2025     4:31 PM   Patient Reported Additional Medications   Patient reports taking the following new medications NA          HPI    Repeat tonsil stones  - Curious if she can talk to someone about her stones  - stones every 2 weeks for the last few months  - Tried removing herself  - Denies infected stones.       Advance Care Planning    Discussed advance care planning with patient; however, patient declined at this time.        6/1/2025   General Health   How would you rate your overall physical health? Good   Feel stress (tense, anxious, or unable to sleep) Not at all         6/1/2025   Nutrition   Three or more servings of calcium each day? (!) I DON'T KNOW   Diet: Regular (no restrictions)   How many servings of fruit and vegetables per day? (!) 0-1   How many sweetened beverages each day? 0-1         6/1/2025   Exercise   Days per week of moderate/strenous exercise 2 days   Average minutes spent exercising at this level 20 min   (!) EXERCISE CONCERN      6/1/2025   Social Factors   Frequency of gathering with friends or relatives Once a week   Worry food won't last until get money to buy more No   Food not last or not have enough money for food? No   Do you have housing? (Housing is defined as stable permanent housing and does not include staying outside in a car, in a tent, in an abandoned building, in an overnight shelter, or couch-surfing.)  Yes   Are you worried about losing your housing? No   Lack of transportation? No   Unable to get utilities (heat,electricity)? No         6/1/2025   Dental   Dentist two times every year? Yes         Today's PHQ-2 Score:       6/1/2025    12:39 PM   PHQ-2 ( 1999 Pf[employer])   Q1: Little interest or pleasure in doing things 0   Q2: Feeling down, depressed or hopeless 0   PHQ-2 Score 0    Q1: Little interest or pleasure in doing things Not at all   Q2: Feeling down, depressed or hopeless Not at all   PHQ-2 Score 0       Patient-reported           6/1/2025   Substance Use   Alcohol more than 3/day or more than 7/wk No   Do you use any other substances recreationally? No     Social History     Tobacco Use    Smoking status: Never    Smokeless tobacco: Never   Vaping Use    Vaping status: Never Used   Substance Use Topics    Alcohol use: Yes    Drug use: Never             6/1/2025   One time HIV Screening   Previous HIV test? I don't know         6/1/2025   STI Screening   New sexual partner(s) since last STI/HIV test? No     History of abnormal Pap smear: No - age 21-29 PAP every 3 years recommended             6/1/2025   Contraception/Family Planning   Questions about contraception or family planning No        Reviewed and updated as needed this visit by Provider   Tobacco  Allergies  Meds  Problems  Med Hx  Surg Hx  Fam Hx            History reviewed. No pertinent past medical history.  Past Surgical History:   Procedure Laterality Date    NO HISTORY OF SURGERY       Lab work is in process  Labs reviewed in EPIC  BP Readings from Last 3 Encounters:   06/02/25 118/66   04/23/24 120/70   04/19/23 125/73    Wt Readings from Last 3 Encounters:   06/02/25 70.8 kg (156 lb)   04/23/24 67.6 kg (149 lb)   04/19/23 68 kg (150 lb) (80%, Z= 0.84)*     * Growth percentiles are based on CDC (Girls, 2-20 Years) data.                      Review of Systems  Constitutional, neuro, ENT, endocrine, pulmonary, cardiac,  "gastrointestinal, genitourinary, musculoskeletal, integument and psychiatric systems are negative, except as otherwise noted.     Objective    Exam  /66   Pulse 91   Temp 98.4  F (36.9  C) (Temporal)   Resp 20   Ht 1.54 m (5' 0.63\")   Wt 70.8 kg (156 lb)   LMP 04/26/2025 (Exact Date)   SpO2 98%   BMI 29.84 kg/m     Estimated body mass index is 29.84 kg/m  as calculated from the following:    Height as of this encounter: 1.54 m (5' 0.63\").    Weight as of this encounter: 70.8 kg (156 lb).    Physical Exam  Constitutional:       General: She is not in acute distress.     Appearance: She is well-developed.   HENT:      Right Ear: External ear normal. No swelling or tenderness. No middle ear effusion. There is no impacted cerumen. Tympanic membrane is not injected, erythematous or bulging.      Left Ear: External ear normal. No swelling or tenderness.  No middle ear effusion. There is no impacted cerumen. Tympanic membrane is not injected, erythematous or bulging.      Nose: Nose normal. No nasal tenderness, mucosal edema, congestion or rhinorrhea.      Right Turbinates: Not enlarged or swollen.      Left Turbinates: Not enlarged or swollen.      Mouth/Throat:      Lips: Pink. No lesions.      Mouth: Mucous membranes are moist. No oral lesions.      Dentition: Normal dentition.      Tongue: No lesions. Tongue does not deviate from midline.      Pharynx: Oropharynx is clear. No pharyngeal swelling, oropharyngeal exudate or posterior oropharyngeal erythema.      Tonsils: No tonsillar exudate.   Eyes:      General: Lids are normal.         Right eye: No discharge.         Left eye: No discharge.      Conjunctiva/sclera: Conjunctivae normal.      Pupils: Pupils are equal, round, and reactive to light.   Neck:      Thyroid: No thyroid mass or thyromegaly.      Vascular: No JVD.      Trachea: No tracheal deviation.   Cardiovascular:      Rate and Rhythm: Normal rate and regular rhythm.      Pulses: Normal " pulses.      Heart sounds: Normal heart sounds. No murmur heard.     No friction rub. No gallop.   Pulmonary:      Effort: Pulmonary effort is normal. No respiratory distress.      Breath sounds: Normal breath sounds. No stridor. No wheezing or rales.   Chest:   Breasts:     Breasts are symmetrical.      Right: No inverted nipple, mass, nipple discharge or skin change.      Left: No inverted nipple, mass, nipple discharge or skin change.   Abdominal:      General: Bowel sounds are normal. There is no distension.      Palpations: Abdomen is soft. There is no hepatomegaly, splenomegaly or mass.      Tenderness: There is no abdominal tenderness. There is no guarding or rebound.      Hernia: No hernia is present.   Genitourinary:     General: Normal vulva.      Vagina: Normal. No vaginal discharge, erythema or tenderness.      Cervix: No cervical motion tenderness, discharge or lesion.      Uterus: Normal. Not enlarged and not tender.       Adnexa: Right adnexa normal and left adnexa normal.        Right: No mass, tenderness or fullness.          Left: No mass, tenderness or fullness.     Musculoskeletal:         General: Normal range of motion.      Cervical back: Normal range of motion and neck supple.      Right lower leg: No edema.      Left lower leg: No edema.   Lymphadenopathy:      Cervical: No cervical adenopathy.      Upper Body:      Right upper body: No supraclavicular or axillary adenopathy.      Left upper body: No supraclavicular or axillary adenopathy.   Skin:     General: Skin is warm and dry.   Neurological:      Mental Status: She is alert and oriented to person, place, and time.   Psychiatric:         Behavior: Behavior normal.         Thought Content: Thought content normal.         Judgment: Judgment normal.         Diagnostics: reviewed in Epic, see orders pending in Epic       Signed Electronically by: Mandi Uribe PA-C

## 2025-06-02 NOTE — PROGRESS NOTES
Prior to immunization administration, verified patients identity using patient s name and date of birth. Please see Immunization Activity for additional information.     Screening Questionnaire for Adult Immunization    Are you sick today?   No   Do you have allergies to medications, food, a vaccine component or latex?   No   Have you ever had a serious reaction after receiving a vaccination?   No   Do you have a long-term health problem with heart, lung, kidney, or metabolic disease (e.g., diabetes), asthma, a blood disorder, no spleen, complement component deficiency, a cochlear implant, or a spinal fluid leak?  Are you on long-term aspirin therapy?   No   Do you have cancer, leukemia, HIV/AIDS, or any other immune system problem?   No   Do you have a parent, brother, or sister with an immune system problem?   No   In the past 3 months, have you taken medications that affect  your immune system, such as prednisone, other steroids, or anticancer drugs; drugs for the treatment of rheumatoid arthritis, Crohn s disease, or psoriasis; or have you had radiation treatments?   No   Have you had a seizure, or a brain or other nervous system problem?   No   During the past year, have you received a transfusion of blood or blood    products, or been given immune (gamma) globulin or antiviral drug?   No   For women: Are you pregnant or is there a chance you could become       pregnant during the next month?   No   Have you received any vaccinations in the past 4 weeks?   No     Immunization questionnaire answers were all negative.      Patient instructed to remain in clinic for 15 minutes afterwards, and to report any adverse reactions.     Screening performed by Frances Wilson CMA on 6/2/2025 at 5:08 PM.

## 2025-06-02 NOTE — PATIENT INSTRUCTIONS
Patient Education   Preventive Care Advice   This is general advice given by our system to help you stay healthy. However, your care team may have specific advice just for you. Please talk to your care team about your preventive care needs.  Nutrition  Eat 5 or more servings of fruits and vegetables each day.  Try wheat bread, brown rice and whole grain pasta (instead of white bread, rice, and pasta).  Get enough calcium and vitamin D. Check the label on foods and aim for 100% of the RDA (recommended daily allowance).  Lifestyle  Exercise at least 150 minutes each week  (30 minutes a day, 5 days a week).  Do muscle strengthening activities 2 days a week. These help control your weight and prevent disease.  No smoking.  Wear sunscreen to prevent skin cancer.  Have a dental exam and cleaning every 6 months.  Yearly exams  See your health care team every year to talk about:  Any changes in your health.  Any medicines your care team has prescribed.  Preventive care, family planning, and ways to prevent chronic diseases.  Shots (vaccines)   HPV shots (up to age 26), if you've never had them before.  Hepatitis B shots (up to age 59), if you've never had them before.  COVID-19 shot: Get this shot when it's due.  Flu shot: Get a flu shot every year.  Tetanus shot: Get a tetanus shot every 10 years.  Pneumococcal, hepatitis A, and RSV shots: Ask your care team if you need these based on your risk.  Shingles shot (for age 50 and up)  General health tests  Diabetes screening:  Starting at age 35, Get screened for diabetes at least every 3 years.  If you are younger than age 35, ask your care team if you should be screened for diabetes.  Cholesterol test: At age 39, start having a cholesterol test every 5 years, or more often if advised.  Bone density scan (DEXA): At age 50, ask your care team if you should have this scan for osteoporosis (brittle bones).  Hepatitis C: Get tested at least once in your life.  STIs (sexually  transmitted infections)  Before age 24: Ask your care team if you should be screened for STIs.  After age 24: Get screened for STIs if you're at risk. You are at risk for STIs (including HIV) if:  You are sexually active with more than one person.  You don't use condoms every time.  You or a partner was diagnosed with a sexually transmitted infection.  If you are at risk for HIV, ask about PrEP medicine to prevent HIV.  Get tested for HIV at least once in your life, whether you are at risk for HIV or not.  Cancer screening tests  Cervical cancer screening: If you have a cervix, begin getting regular cervical cancer screening tests starting at age 21.  Breast cancer scan (mammogram): If you've ever had breasts, begin having regular mammograms starting at age 40. This is a scan to check for breast cancer.  Colon cancer screening: It is important to start screening for colon cancer at age 45.  Have a colonoscopy test every 10 years (or more often if you're at risk) Or, ask your provider about stool tests like a FIT test every year or Cologuard test every 3 years.  To learn more about your testing options, visit:   .  For help making a decision, visit:   https://bit.ly/ol87556.  Prostate cancer screening test: If you have a prostate, ask your care team if a prostate cancer screening test (PSA) at age 55 is right for you.  Lung cancer screening: If you are a current or former smoker ages 50 to 80, ask your care team if ongoing lung cancer screenings are right for you.  For informational purposes only. Not to replace the advice of your health care provider. Copyright   2023 [employer]. All rights reserved. Clinically reviewed by the [facility] Transitions Program. CISSOID [id number] - REV 01/24.

## 2025-06-03 ENCOUNTER — PATIENT OUTREACH (OUTPATIENT)
Dept: CARE COORDINATION | Facility: CLINIC | Age: 20
End: 2025-06-03
Payer: COMMERCIAL

## 2025-06-04 PROBLEM — J35.8 TONSILLAR CALCULUS: Status: ACTIVE | Noted: 2025-06-04

## 2025-06-05 ENCOUNTER — RESULTS FOLLOW-UP (OUTPATIENT)
Dept: OBGYN | Facility: CLINIC | Age: 20
End: 2025-06-05

## 2025-06-05 ENCOUNTER — PATIENT OUTREACH (OUTPATIENT)
Dept: CARE COORDINATION | Facility: CLINIC | Age: 20
End: 2025-06-05
Payer: COMMERCIAL

## 2025-06-05 LAB
BKR LAB AP GYN ADEQUACY: NORMAL
BKR LAB AP GYN INTERPRETATION: NORMAL
BKR LAB AP HPV REFLEX: NO
BKR LAB AP PREVIOUS ABNORMAL: NORMAL
PATH REPORT.COMMENTS IMP SPEC: NORMAL
PATH REPORT.COMMENTS IMP SPEC: NORMAL
PATH REPORT.RELEVANT HX SPEC: NORMAL

## 2025-06-24 ENCOUNTER — APPOINTMENT (OUTPATIENT)
Dept: CT IMAGING | Facility: CLINIC | Age: 20
End: 2025-06-24
Attending: FAMILY MEDICINE
Payer: COMMERCIAL

## 2025-06-24 ENCOUNTER — HOSPITAL ENCOUNTER (EMERGENCY)
Facility: CLINIC | Age: 20
Discharge: HOME | End: 2025-06-24
Attending: FAMILY MEDICINE | Admitting: FAMILY MEDICINE
Payer: COMMERCIAL

## 2025-06-24 VITALS
HEIGHT: 60 IN | HEART RATE: 81 BPM | TEMPERATURE: 97.8 F | SYSTOLIC BLOOD PRESSURE: 114 MMHG | RESPIRATION RATE: 20 BRPM | OXYGEN SATURATION: 97 % | BODY MASS INDEX: 31.41 KG/M2 | DIASTOLIC BLOOD PRESSURE: 71 MMHG | WEIGHT: 160 LBS

## 2025-06-24 DIAGNOSIS — R51.9 OCCIPITAL PAIN: ICD-10-CM

## 2025-06-24 DIAGNOSIS — M54.2 CERVICAL PAIN: ICD-10-CM
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
## 2025-06-24 PROCEDURE — 99283 EMERGENCY DEPT VISIT LOW MDM: CPT | Performed by: FAMILY MEDICINE

## 2025-06-24 PROCEDURE — 250N000013 HC RX MED GY IP 250 OP 250 PS 637: Performed by: FAMILY MEDICINE

## 2025-06-24 PROCEDURE — 72125 CT NECK SPINE W/O DYE: CPT

## 2025-06-24 PROCEDURE — 99284 EMERGENCY DEPT VISIT MOD MDM: CPT | Mod: 25

## 2025-06-24 PROCEDURE — 70450 CT HEAD/BRAIN W/O DYE: CPT

## 2025-06-24 RX ORDER — ACETAMINOPHEN 325 MG/1
975 TABLET ORAL ONCE
Status: COMPLETED | OUTPATIENT
Start: 2025-06-24 | End: 2025-06-24

## 2025-06-24 RX ADMIN — ACETAMINOPHEN 975 MG: 325 TABLET ORAL at 07:42

## 2025-06-24 ASSESSMENT — COLUMBIA-SUICIDE SEVERITY RATING SCALE - C-SSRS
6. HAVE YOU EVER DONE ANYTHING, STARTED TO DO ANYTHING, OR PREPARED TO DO ANYTHING TO END YOUR LIFE?: NO
1. IN THE PAST MONTH, HAVE YOU WISHED YOU WERE DEAD OR WISHED YOU COULD GO TO SLEEP AND NOT WAKE UP?: NO
2. HAVE YOU ACTUALLY HAD ANY THOUGHTS OF KILLING YOURSELF IN THE PAST MONTH?: NO

## 2025-06-24 ASSESSMENT — ENCOUNTER SYMPTOMS
WEAKNESS: 0
NUMBNESS: 0
NECK STIFFNESS: 1
SPEECH DIFFICULTY: 0
NECK PAIN: 1
HEADACHES: 1

## 2025-06-24 ASSESSMENT — ACTIVITIES OF DAILY LIVING (ADL)
ADLS_ACUITY_SCORE: 41

## 2025-06-24 NOTE — ED TRIAGE NOTES
Triage Assessment (Adult)       Row Name 06/24/25 0556          Triage Assessment    Airway WDL WDL        Respiratory WDL    Respiratory WDL WDL                   Slipped while walking down wooden steps this am.  Primary complaint is pain on the back of her head.  She reports seeing stars and black spots post fall but cannot recall any LOC.

## 2025-06-24 NOTE — ED PROVIDER NOTES
History     Chief Complaint   Patient presents with    Fall     HPI  Lita Alvares is a 20 year old female who presented to the emergency room today with her fiancé secondary concerns of injury suffered in a fall that occurred this morning.  Patient states that she was walking down the stairs of her home when she slipped causing her to fall backwards landing with the base of her head and neck on the metal edge of a stair.  She states that she had significant pain to the posterior aspect of her head and upper neck immediately with the fall associated with loss of vision and blacking out since.  She is not aware of loss of consciousness.  She states that it took her a while to get her bearings and when she did she got up and ran to notify her significant other of the fall and injury.  The episode occurred at approximately 540 this morning.  Patient states that she had a blackness to her vision especially in the mid part of her vision lasting about 10 minutes with gradual resolution of that blackened area.  She has been nauseated and has also had some dizziness which is somewhat improved now.  She has pain to the base of her skull posteriorly and to the upper neck area.  She denies any prior injury to this area.  Patient states that she has no numbness or tingling to her extremities.  She denies any muscle weakness to the extremities.  She was able to ambulate on her own power.  She denied any other injury associated with a fall on the stairs this morning.    Allergies:  No Known Allergies    Problem List:    Patient Active Problem List    Diagnosis Date Noted    Tonsillar calculus 06/04/2025     Priority: Medium    No active medical problems 07/26/2013     Priority: Medium        Past Medical History:    No past medical history on file.    Past Surgical History:    Past Surgical History:   Procedure Laterality Date    NO HISTORY OF SURGERY         Family History:    Family History   Problem Relation Age of  Onset    Anxiety Disorder Mother     Cancer Maternal Grandmother         colon    Cerebrovascular Disease Maternal Grandmother     Cancer Maternal Grandfather         colon    Heart Disease Maternal Grandfather 50    Cancer Paternal Grandfather         Hodgkins/Prostate    Asthma Paternal Grandfather     Asthma Paternal Uncle        Social History:  Marital Status:  Single [1]  Social History     Tobacco Use    Smoking status: Never    Smokeless tobacco: Never   Vaping Use    Vaping status: Never Used   Substance Use Topics    Alcohol use: Yes    Drug use: Never        Medications:    No current outpatient medications on file.        Review of Systems   Eyes:  Positive for visual disturbance.   Musculoskeletal:  Positive for neck pain and neck stiffness.   Neurological:  Positive for headaches. Negative for speech difficulty, weakness and numbness.   All other systems reviewed and are negative.      Physical Exam   BP: 134/80  Pulse: 86  Temp: 97.9  F (36.6  C)  Resp: 22  SpO2: 100 %      Physical Exam  Vitals and nursing note reviewed. Exam conducted with a chaperone present (Patient's fiancé present in the room at the time of exam).   Constitutional:       General: She is in acute distress.      Appearance: She is not ill-appearing, toxic-appearing or diaphoretic.   HENT:      Head: Normocephalic.      Comments: Patient with tenderness to palpation to the occipital area of the skull.  No obvious depression or step-off of the skull noted with palpation of the area.  There is mild swelling to the soft tissue of the skull in the occipital area.  No open skin wound identified.    Neck:      Comments: Patient with tenderness on motion and palpation to the upper cervical spine in the midline extending to the base of the skull.  No obvious deformity noted.  Patient with no open skin wound to the area.  There is tenderness with palpation to the base of the skull as well.  Cardiovascular:      Rate and Rhythm: Normal rate.       Pulses: Normal pulses.   Pulmonary:      Effort: Pulmonary effort is normal.   Musculoskeletal:         General: Tenderness and signs of injury present. No deformity.   Skin:     Capillary Refill: Capillary refill takes less than 2 seconds.      Coloration: Skin is not jaundiced.      Findings: No bruising or rash.   Neurological:      Mental Status: She is alert and oriented to person, place, and time.   Psychiatric:         Behavior: Behavior normal.         ED Course        Procedures              Critical Care time:  none     None       Assessments & Plan (with Medical Decision Making)  20-year-old female to the ER secondary concerns of injury suffered in a fall this morning at approximately 540 \a.m.  Patient stated that she slipped going down the stairs of her home causing her to fall backwards landing with her posterior head striking the edge of a metal stair.  She has pain to the upper neck and occipital area of her skull.  She is also with nausea, vision changes, and dizziness.  She is not aware of specific loss of consciousness.  Because of his continued symptoms she decided come in to get checked.  She denies any prior similar injuries.  She denies possibility of pregnancy.  Exam as noted above.  Patient signed out to Dr. Hagen at shift change.     I have reviewed the nursing notes.         Disclaimer: This note consists of words and symbols derived from keyboarding and dictation using voice recognition software.  As a result, there may be errors that have gone undetected.  Please consider this when interpreting information found in this note.      Final diagnoses:   Fall on stairs, initial encounter   Occipital pain   Cervical pain       6/24/2025   [facility] EMERGENCY DEPT       Jensen Felix,   06/24/25 0712

## 2025-06-24 NOTE — ED PROVIDER NOTES
Transfer of Care Note  This patient was signed out to me and I assumed care from Dr. Felix at change of shift.  Lita Alvares is a 20 year old female who presented to the emergency department with a chief complaint of Fall  .  Please see the original providers history and physical for complete details.    The following issues were signed out to me to follow up on:  CT scan and disposition      Pertant Lab/Imaging Findings During this Visit was     Results for orders placed or performed during the hospital encounter of 06/24/25 (from the past 24 hours)   Head CT w/o contrast    Narrative    EXAM: CT HEAD W/O CONTRAST  LOCATION: [facility]  DATE: 6/24/2025    INDICATION: Fall at home on stairs, struck occipital area of head against the edge of the stair causing vision change, headache, nausea, and dizziness.  COMPARISON: None.  TECHNIQUE: Routine CT Head without IV contrast. Multiplanar reformats. Dose reduction techniques were used.    FINDINGS:  INTRACRANIAL CONTENTS: No intracranial hemorrhage, extraaxial collection, or mass effect.  No CT evidence of acute infarct. Normal parenchymal attenuation. Normal ventricles and sulci.     VISUALIZED ORBITS/SINUSES/MASTOIDS: No intraorbital abnormality. No paranasal sinus mucosal disease. No middle ear or mastoid effusion.    BONES/SOFT TISSUES: No acute abnormality.      Impression    IMPRESSION:  1.  Normal head CT.   CT Cervical Spine w/o Contrast    Narrative    EXAM: CT CERVICAL SPINE W/O CONTRAST  LOCATION: [facility]  DATE: 6/24/2025    INDICATION: Fall in home.  Pain to base of skull and upper cervical area.  COMPARISON: None.  TECHNIQUE: Routine CT Cervical Spine without IV contrast. Multiplanar reformats. Dose reduction techniques were used.      Impression    IMPRESSION: There is normal alignment of the cervical vertebrae; however, there is reversal of normal cervical lordosis centered at  the no spinal canal stenosis. No prevertebral soft tissue swelling. level. Vertebral body heights of the cervical spine   are normal. Craniocervical alignment is normal. No fractures.                     My focused follow up physical exam shows:   Vitals:  B/P: 121/69, T: 97.9, P: 81, R: 22  Gen:  Pt appears stable and no apparent distress      ED Course & Medical Decision Making:  CT scan came back and was unremarkable.  I reassured the patient, patient likely has just a close head injury, may be mild concussion.  Patient will be discharged at this time.  Patient was told to return if symptoms do change or worsen.         Impression and Disposition:  Closed head injury           This note was completed in part using Dragon voice recognition, and may contain word and grammatical errors.        Grover Hagen MD  06/24/25 [id number]